# Patient Record
(demographics unavailable — no encounter records)

---

## 2024-11-18 NOTE — REASON FOR VISIT
[Initial Evaluation] : an initial evaluation [Hypothyroidism] : hypothyroidism [Weight Management/Obesity] : weight management/obesity [Hypogonadism] : hypogonadism

## 2024-11-19 NOTE — HISTORY OF PRESENT ILLNESS
[FreeTextEntry1] : Mr. Bill is presenting for hypothyroidism and hypogonadism.   59 year old male with PMH of BMI 34, hypothyroidism, in 2021 (needed ACLS with CPR and intubation, twice in the same month), multiple joint replacement including b/l TKR, b/l hip replacement (8 surgeries total in 8 years), s/p medtronic PPM, normal coronary angiogram, currently still smoking 1/2 PPD, HTN, HDL, sleep apnea (cannot tolerate CPAP).   #Hypothyroidism  Diagnosed ~20 years ago  Currently on LT4 500mcg (2 tabs of 250mcg QD) for the past 10 years. Takes the medication when he remembers.  April 2024 TSH 0.37 on LT4 500mcg QD   #Hypogonadism Denies h/o blood clot. No h/o prostate cancer.  2015 testosterone level was 186.  Low energy, Low libido. Able to maintain an erection.  Denies hair loss on extremities, axillary or pubic region.

## 2024-11-19 NOTE — ASSESSMENT
[Levothyroxine] : The patient was instructed to take Levothyroxine on an empty stomach, separate from vitamins, and wait at least 30 minutes before eating [FreeTextEntry1] : 59 year old male with PMH of BMI 34, hypothyroidism, in 2021 (needed ACLS with CPR and intubation, twice in the same month), multiple joint replacement including b/l TKR, b/l hip replacement (8 surgeries total in 8 years), s/p medtronic PPM, normal coronary angiogram, currently still smoking 1/2 PPD, HTN, HDL, sleep apnea (cannot tolerate CPAP) is presenting for hypothyroidism and hypogonadism.

## 2024-11-19 NOTE — PHYSICAL EXAM
[Alert] : alert [Well Nourished] : well nourished [Healthy Appearance] : healthy appearance [EOMI] : extra ocular movement intact [Normal Hearing] : hearing was normal [No Respiratory Distress] : no respiratory distress [Normal Rate] : heart rate was normal [No CVA Tenderness] : no ~M costovertebral angle tenderness [Normal Gait] : normal gait [No Tremors] : no tremors [Oriented x3] : oriented to person, place, and time [Normal Affect] : the affect was normal [Normal Mood] : the mood was normal

## 2024-11-22 NOTE — DISCUSSION/SUMMARY
[FreeTextEntry1] : 59 year old man with sinus node dysfunction s/p dual chamber pacemaker with subsequent extraction reimplantation after injury to the lead during a surgical procedure. I'm pleased to see him doing well today with normal device function. He will follow up in 6 months and will be enrolled in remotes monitoring.  He appeared to understand the whole discussion and verbalized that all of his questions were answered to his satisfaction.  Thank you for allowing me to be involved in the care of this pleasant man. Please feel free to contact me with any questions.    Rey Washington MD  of Cardiology Electrophysiology Section [address] [place] [address] Office: [phone] Fax: [phone] [EKG obtained to assist in diagnosis and management of assessed problem(s)] : EKG obtained to assist in diagnosis and management of assessed problem(s)

## 2024-11-22 NOTE — CARDIOLOGY SUMMARY
[de-identified] : Abbott Assurity MRI 2272 SN# [id number] DOI: 02/16/2024 by Dr. Howard DDD 60/130 Battery status:  Longevity 8.5 to 10.7 yrs   Voltage 3.02V  RALead: capture: 1.0V @ 0.4ms RA sense: 1.1mv RA impedance: 330 ohms  RV lead: capture: 1.0V @ 0.4ms RV sense: 87.mV RV impedance: 410 ohms  Normal device function in DDD 60/130; Appropriate sensing and capture thresholds; stable lead impedance and battery status.

## 2024-11-22 NOTE — CARDIOLOGY SUMMARY
[de-identified] : Abbott Assurity MRI 2272 SN# [id number] DOI: 02/16/2024 by Dr. Howard DDD 60/130 Battery status:  Longevity 8.5 to 10.7 yrs   Voltage 3.02V  RALead: capture: 1.0V @ 0.4ms RA sense: 1.1mv RA impedance: 330 ohms  RV lead: capture: 1.0V @ 0.4ms RV sense: 87.mV RV impedance: 410 ohms  Normal device function in DDD 60/130; Appropriate sensing and capture thresholds; stable lead impedance and battery status.

## 2024-11-22 NOTE — DISCUSSION/SUMMARY
[FreeTextEntry1] : 59 year old man with sinus node dysfunction s/p dual chamber pacemaker with subsequent extraction reimplantation after injury to the lead during a surgical procedure. I'm pleased to see him doing well today with normal device function. He will follow up in 6 months and will be enrolled in remotes monitoring.  He appeared to understand the whole discussion and verbalized that all of his questions were answered to his satisfaction.  Thank you for allowing me to be involved in the care of this pleasant man. Please feel free to contact me with any questions.    Rey Washington MD  of Cardiology Electrophysiology Section [address] [place] [address] Office: [phone] Fax: [phone] [EKG obtained to assist in diagnosis and management of assessed problem(s)] : EKG obtained to assist in diagnosis and management of assessed problem(s)

## 2024-11-22 NOTE — REASON FOR VISIT
[FreeTextEntry3] : Dr. CALLY Howard [FreeTextEntry1] : 59 yr old male with PMHx of anxiety disorder, HTN, Chronic Lower back pain, depression, current cigarette smoker, arthritis, syncope and collapse with sinus pause s/p PPM 02/16/2024. Patient had extraction of previous Medtronic pacemaker and RA/RV leads by Dr. Howard secondary to lead clipped during surgical procedure. He presents now for device follow up.  He denies any incidence of chest pain, shortness of breath, palpitations, dizziness, edema, or syncope.

## 2025-01-14 NOTE — DISCUSSION/SUMMARY
[FreeTextEntry1] : 59 year man with a history as listed presents for a follow up cardiac evaluation.   Joaquin is doing well. He denies any anginal symptoms. Clinically he is euvolemic on exam. His LEE is likely from deconditioning.  His EKG did not reveal any significant ischemic changes.   He is s/p shoulder surgery that indecently lead to PPM device lead being clipped and now s/p full extraction and reimplantation of MRI conventional device.  He will follow-up with EP at  for his remote monitoring.    He has history of sinus pauses that resulted in getting a Medtronic PPM. His neurological workup has thus far been unrevealing. [facility] never sent me his records. Though he had recurrent syncope with the PPM in place. I assume that this may be from a drop in his BP from significant vagal tone from PRICILLA. He will followup with pulmonary. he has not been able to tolerate CPAP.   His blood pressure  is low. I would stop the Norvasc.   He will continue with statin therapy to achieve maintain goal LDL<100.   Exercise and diet counseling was performed in order to reduce her future cardiovascular risk. He will follow up with me in 4-6 months or sooner if necessary.    [EKG obtained to assist in diagnosis and management of assessed problem(s)] : EKG obtained to assist in diagnosis and management of assessed problem(s)

## 2025-01-14 NOTE — HISTORY OF PRESENT ILLNESS
[FreeTextEntry1] : 59 year old man with a history of PRICILLA, syncope with sinus pauses s/p PPM presents for a followup visit.   Prior history includes three episode of syncope over the last 2 months. Each time was without a prodrome. The first time in 10.28.21, he needed ACLS with CPR and intubation. He was admitted to [facility] ICU. He was discharged with a Ziopatch. He then had a recurrent syncopal episode one hour after getting home. He was take back to the [facility] and required intubation again. He was noted to have a pause on Ziopatch. He had a normal coronary angiogram. He received a medtronic PPM. Then on 11/20/21 he found to have another unresponsive episode. PPM reportedly was normal. He was thought to have sleep apnea.   During shoulder surgery in 2/2024,  his PM lead was clipped, subsequently on 2/16/24 had full device extraction including leads and new ABBOTT device MRI conditional system.   Since his last visit, he has been feeling well overall. He was not taking his Synthroid the right way. It has now been adjusted.  He has been very sedentary from his back pain and shoulder pain.  He has mild LEE. Notes lightheadedness with standing up quickly.  He   denies any chest pain, PND, orthopnea, lower extremity edema,  syncope, strokelike symptoms.   Medication reconciliation performed. He is compliant with his medications.  He is still smoking 1/2 PPD.

## 2025-05-12 NOTE — REASON FOR VISIT
[Follow - Up] : a follow-up visit [Hypothyroidism] : hypothyroidism [Weight Management/Obesity] : weight management/obesity [Hypogonadism] : hypogonadism

## 2025-05-12 NOTE — HISTORY OF PRESENT ILLNESS
[FreeTextEntry1] : Mr. Bill is presenting for hypothyroidism and hypogonadism.   59 year old male with PMH of BMI 34, hypothyroidism, in 2021 (needed ACLS with CPR and intubation, twice in the same month), multiple joint replacement including b/l TKR, b/l hip replacement (8 surgeries total in 8 years), s/p medtronic PPM, normal coronary angiogram, currently still smoking 1/2 PPD, HTN, HDL, Celiac disease, sleep apnea (cannot tolerate CPAP).   #Hypothyroidism with Celiac disease  Diagnosed ~20 years ago  Currently on LT4 500mcg (2 tabs of 250mcg QD) for the past 10 years. Takes the medication when he remembers.  April 2024 TSH 0.37 on LT4 500mcg QD  Dec 2024: Pt has celiac disease most likely given positive celiac gene present along with positive reflex tissue transglutaminase IgA. Likely this is the reason patient is not absorbing levothyroxine. Pt notes it will be hard to avoid Gluten. His mother had severe Celiac disease.  #Hypogonadism Denies h/o blood clot. No h/o prostate cancer.  2015 testosterone level was 186.  Low energy, Low libido. Able to maintain an erection.  Denies hair loss on extremities, axillary or pubic region.  FSH elevated to 14, low testosterone to 67.  Interval hx:  May 2025 TSH 41.90, FT4 0.5 on LT4 200mcg (Patient was not able to fill unithroid due to insurance)

## 2025-05-12 NOTE — ASSESSMENT
[Levothyroxine] : The patient was instructed to take Levothyroxine on an empty stomach, separate from vitamins, and wait at least 30 minutes before eating [FreeTextEntry1] : 59 year old male with PMH of BMI 34, hypothyroidism, in 2021 (needed ACLS with CPR and intubation, twice in the same month), multiple joint replacement including b/l TKR, b/l hip replacement (8 surgeries total in 8 years), s/p medtronic PPM, normal coronary angiogram, currently still smoking 1/2 PPD, HTN, HDL, sleep apnea (cannot tolerate CPAP) is presenting for follow up of hypothyroidism and hypogonadism.   1. Hypogonadism  -Discussed differentials, incl primary vs secondary.  -Will send for repeat fasting testosterone level incl macroprolactin, prolactin, FSH, LH -Patient is high risk for possible TRT replacement.   2. Hypothyroidism  -Currently on LT4 200mcg QD as pharmacy did not fill Unithroid 200mg QD -Pt given Unithoroid 100mcg, 2 tabs QD samples for 4 weeks. Mail order script sent to Unithroid mail order program.  -May 2025 TSH 41.90, FT4 0.5 on LT4 200mcg (Patient was not able to fill Unithroid due to insurance) -Pt did not yet see GI. Referred again to GI.  -Repeat TFTs in 3 weeks   Patient verbalized understanding of the above. All questions were answered to patient's satisfaction. Dispo: Patient will follow up in 4 months.